# Patient Record
Sex: FEMALE | Race: WHITE | NOT HISPANIC OR LATINO | URBAN - METROPOLITAN AREA
[De-identification: names, ages, dates, MRNs, and addresses within clinical notes are randomized per-mention and may not be internally consistent; named-entity substitution may affect disease eponyms.]

---

## 2021-05-18 ENCOUNTER — INPATIENT (INPATIENT)
Facility: HOSPITAL | Age: 51
LOS: 2 days | Discharge: HOME | DRG: 603 | End: 2021-05-21
Attending: INTERNAL MEDICINE | Admitting: STUDENT IN AN ORGANIZED HEALTH CARE EDUCATION/TRAINING PROGRAM
Payer: COMMERCIAL

## 2021-05-18 VITALS
HEART RATE: 95 BPM | TEMPERATURE: 99 F | DIASTOLIC BLOOD PRESSURE: 87 MMHG | OXYGEN SATURATION: 98 % | RESPIRATION RATE: 20 BRPM | SYSTOLIC BLOOD PRESSURE: 155 MMHG | HEIGHT: 64 IN | WEIGHT: 134.92 LBS

## 2021-05-18 LAB
ALBUMIN SERPL ELPH-MCNC: 3.6 G/DL — SIGNIFICANT CHANGE UP (ref 3.3–5)
ALP SERPL-CCNC: 53 U/L — SIGNIFICANT CHANGE UP (ref 40–120)
ALT FLD-CCNC: 8 U/L — LOW (ref 12–78)
ANION GAP SERPL CALC-SCNC: 6 MMOL/L — SIGNIFICANT CHANGE UP (ref 5–17)
APTT BLD: 25.8 SEC — LOW (ref 27.5–35.5)
AST SERPL-CCNC: 8 U/L — LOW (ref 15–37)
BASOPHILS # BLD AUTO: 0.04 K/UL — SIGNIFICANT CHANGE UP (ref 0–0.2)
BASOPHILS NFR BLD AUTO: 0.4 % — SIGNIFICANT CHANGE UP (ref 0–2)
BILIRUB SERPL-MCNC: 0.3 MG/DL — SIGNIFICANT CHANGE UP (ref 0.2–1.2)
BUN SERPL-MCNC: 11 MG/DL — SIGNIFICANT CHANGE UP (ref 7–23)
CALCIUM SERPL-MCNC: 8.8 MG/DL — SIGNIFICANT CHANGE UP (ref 8.5–10.1)
CHLORIDE SERPL-SCNC: 110 MMOL/L — HIGH (ref 96–108)
CO2 SERPL-SCNC: 25 MMOL/L — SIGNIFICANT CHANGE UP (ref 22–31)
CREAT SERPL-MCNC: 0.71 MG/DL — SIGNIFICANT CHANGE UP (ref 0.5–1.3)
EOSINOPHIL # BLD AUTO: 0.07 K/UL — SIGNIFICANT CHANGE UP (ref 0–0.5)
EOSINOPHIL NFR BLD AUTO: 0.8 % — SIGNIFICANT CHANGE UP (ref 0–6)
GLUCOSE SERPL-MCNC: 96 MG/DL — SIGNIFICANT CHANGE UP (ref 70–99)
HCG SERPL-ACNC: <1 MIU/ML — SIGNIFICANT CHANGE UP
HCT VFR BLD CALC: 39.1 % — SIGNIFICANT CHANGE UP (ref 34.5–45)
HGB BLD-MCNC: 13 G/DL — SIGNIFICANT CHANGE UP (ref 11.5–15.5)
IMM GRANULOCYTES NFR BLD AUTO: 0.3 % — SIGNIFICANT CHANGE UP (ref 0–1.5)
INR BLD: 0.97 RATIO — SIGNIFICANT CHANGE UP (ref 0.88–1.16)
LACTATE SERPL-SCNC: 1.1 MMOL/L — SIGNIFICANT CHANGE UP (ref 0.7–2)
LYMPHOCYTES # BLD AUTO: 1.86 K/UL — SIGNIFICANT CHANGE UP (ref 1–3.3)
LYMPHOCYTES # BLD AUTO: 20.7 % — SIGNIFICANT CHANGE UP (ref 13–44)
MCHC RBC-ENTMCNC: 29.2 PG — SIGNIFICANT CHANGE UP (ref 27–34)
MCHC RBC-ENTMCNC: 33.2 GM/DL — SIGNIFICANT CHANGE UP (ref 32–36)
MCV RBC AUTO: 87.9 FL — SIGNIFICANT CHANGE UP (ref 80–100)
MONOCYTES # BLD AUTO: 0.77 K/UL — SIGNIFICANT CHANGE UP (ref 0–0.9)
MONOCYTES NFR BLD AUTO: 8.6 % — SIGNIFICANT CHANGE UP (ref 2–14)
NEUTROPHILS # BLD AUTO: 6.21 K/UL — SIGNIFICANT CHANGE UP (ref 1.8–7.4)
NEUTROPHILS NFR BLD AUTO: 69.2 % — SIGNIFICANT CHANGE UP (ref 43–77)
NRBC # BLD: 0 /100 WBCS — SIGNIFICANT CHANGE UP (ref 0–0)
PLATELET # BLD AUTO: 254 K/UL — SIGNIFICANT CHANGE UP (ref 150–400)
POTASSIUM SERPL-MCNC: 3.8 MMOL/L — SIGNIFICANT CHANGE UP (ref 3.5–5.3)
POTASSIUM SERPL-SCNC: 3.8 MMOL/L — SIGNIFICANT CHANGE UP (ref 3.5–5.3)
PROT SERPL-MCNC: 7.4 G/DL — SIGNIFICANT CHANGE UP (ref 6–8.3)
PROTHROM AB SERPL-ACNC: 11.4 SEC — SIGNIFICANT CHANGE UP (ref 10.6–13.6)
RBC # BLD: 4.45 M/UL — SIGNIFICANT CHANGE UP (ref 3.8–5.2)
RBC # FLD: 12.7 % — SIGNIFICANT CHANGE UP (ref 10.3–14.5)
SODIUM SERPL-SCNC: 141 MMOL/L — SIGNIFICANT CHANGE UP (ref 135–145)
WBC # BLD: 8.98 K/UL — SIGNIFICANT CHANGE UP (ref 3.8–10.5)
WBC # FLD AUTO: 8.98 K/UL — SIGNIFICANT CHANGE UP (ref 3.8–10.5)

## 2021-05-18 PROCEDURE — 73130 X-RAY EXAM OF HAND: CPT | Mod: 26,RT

## 2021-05-18 PROCEDURE — 99285 EMERGENCY DEPT VISIT HI MDM: CPT

## 2021-05-18 PROCEDURE — 93010 ELECTROCARDIOGRAM REPORT: CPT

## 2021-05-18 RX ORDER — AMPICILLIN SODIUM AND SULBACTAM SODIUM 250; 125 MG/ML; MG/ML
3 INJECTION, POWDER, FOR SUSPENSION INTRAMUSCULAR; INTRAVENOUS ONCE
Refills: 0 | Status: COMPLETED | OUTPATIENT
Start: 2021-05-18 | End: 2021-05-18

## 2021-05-18 RX ORDER — SODIUM CHLORIDE 9 MG/ML
1000 INJECTION INTRAMUSCULAR; INTRAVENOUS; SUBCUTANEOUS ONCE
Refills: 0 | Status: COMPLETED | OUTPATIENT
Start: 2021-05-18 | End: 2021-05-18

## 2021-05-18 RX ADMIN — AMPICILLIN SODIUM AND SULBACTAM SODIUM 3 GRAM(S): 250; 125 INJECTION, POWDER, FOR SUSPENSION INTRAMUSCULAR; INTRAVENOUS at 23:31

## 2021-05-18 RX ADMIN — AMPICILLIN SODIUM AND SULBACTAM SODIUM 200 GRAM(S): 250; 125 INJECTION, POWDER, FOR SUSPENSION INTRAMUSCULAR; INTRAVENOUS at 22:00

## 2021-05-18 RX ADMIN — SODIUM CHLORIDE 1000 MILLILITER(S): 9 INJECTION INTRAMUSCULAR; INTRAVENOUS; SUBCUTANEOUS at 23:31

## 2021-05-18 RX ADMIN — SODIUM CHLORIDE 1000 MILLILITER(S): 9 INJECTION INTRAMUSCULAR; INTRAVENOUS; SUBCUTANEOUS at 22:00

## 2021-05-18 NOTE — ED PROVIDER NOTE - OBJECTIVE STATEMENT
51 yo F p/w was bit by a known cat in R hand yesterday. Outdoor cat can be monitored for behavioral changes. Pt was seen at urgent care after and started on augmentin. pt with inc redness and swelling, now spreading proximally. No fever/chills. no numb/ting/focal weak. no abd pain. no n/v/d. no neck /back pain. no agg/allev factors. no other inj or co.

## 2021-05-18 NOTE — ED ADULT NURSE REASSESSMENT NOTE - NS ED NURSE REASSESS COMMENT FT1
Patient walked to the bathroom informed of the plan of care with understanding waiting for MD to talk to her.

## 2021-05-18 NOTE — ED PROVIDER NOTE - PROGRESS NOTE DETAILS
attempted to call Dr. ROBERT Lester multiple times, no answer, left VM, Dr. Myers made aware and will now admit

## 2021-05-18 NOTE — ED ADULT NURSE NOTE - OBJECTIVE STATEMENT
Patient presents to ED with complaint of cat bite last night to her right hand noted with swelling and redness was seen at urgent care and was prescribed augmentin which she started already was seen and evaluated by MD Avalos with orders made and carried out blood sent to lab due medications given as per order.

## 2021-05-18 NOTE — ED PROVIDER NOTE - PHYSICAL EXAMINATION
R hand: pos bite to R 2nd MCP with dorcal erythema / waemth with spread to dorsum of hand, streaking to mid forearm. FROM all joints without pain. nl dist str/sens equal bl, 2+ pulses. nl cap refill.

## 2021-05-18 NOTE — ED ADULT TRIAGE NOTE - CHIEF COMPLAINT QUOTE
Pt bit by stray cat yesterday to Rt hand, was seen in urgent care given tetanus shot and Augmentin, today swelling is worse

## 2021-05-19 DIAGNOSIS — Z98.891 HISTORY OF UTERINE SCAR FROM PREVIOUS SURGERY: Chronic | ICD-10-CM
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
## 2021-05-19 LAB
ALBUMIN SERPL ELPH-MCNC: 3.2 G/DL — LOW (ref 3.3–5)
ALP SERPL-CCNC: 44 U/L — SIGNIFICANT CHANGE UP (ref 40–120)
ALT FLD-CCNC: 9 U/L — LOW (ref 12–78)
ANION GAP SERPL CALC-SCNC: 9 MMOL/L — SIGNIFICANT CHANGE UP (ref 5–17)
AST SERPL-CCNC: 8 U/L — LOW (ref 15–37)
BASOPHILS # BLD AUTO: 0.04 K/UL — SIGNIFICANT CHANGE UP (ref 0–0.2)
BASOPHILS NFR BLD AUTO: 0.5 % — SIGNIFICANT CHANGE UP (ref 0–2)
BILIRUB SERPL-MCNC: 0.5 MG/DL — SIGNIFICANT CHANGE UP (ref 0.2–1.2)
BUN SERPL-MCNC: 7 MG/DL — SIGNIFICANT CHANGE UP (ref 7–23)
CALCIUM SERPL-MCNC: 8.2 MG/DL — LOW (ref 8.5–10.1)
CHLORIDE SERPL-SCNC: 110 MMOL/L — HIGH (ref 96–108)
CO2 SERPL-SCNC: 23 MMOL/L — SIGNIFICANT CHANGE UP (ref 22–31)
CREAT SERPL-MCNC: 0.63 MG/DL — SIGNIFICANT CHANGE UP (ref 0.5–1.3)
EOSINOPHIL # BLD AUTO: 0.07 K/UL — SIGNIFICANT CHANGE UP (ref 0–0.5)
EOSINOPHIL NFR BLD AUTO: 0.9 % — SIGNIFICANT CHANGE UP (ref 0–6)
GLUCOSE SERPL-MCNC: 84 MG/DL — SIGNIFICANT CHANGE UP (ref 70–99)
HCT VFR BLD CALC: 36.1 % — SIGNIFICANT CHANGE UP (ref 34.5–45)
HGB BLD-MCNC: 12.2 G/DL — SIGNIFICANT CHANGE UP (ref 11.5–15.5)
IMM GRANULOCYTES NFR BLD AUTO: 0.3 % — SIGNIFICANT CHANGE UP (ref 0–1.5)
LYMPHOCYTES # BLD AUTO: 1.75 K/UL — SIGNIFICANT CHANGE UP (ref 1–3.3)
LYMPHOCYTES # BLD AUTO: 23.2 % — SIGNIFICANT CHANGE UP (ref 13–44)
MCHC RBC-ENTMCNC: 29.8 PG — SIGNIFICANT CHANGE UP (ref 27–34)
MCHC RBC-ENTMCNC: 33.8 GM/DL — SIGNIFICANT CHANGE UP (ref 32–36)
MCV RBC AUTO: 88.3 FL — SIGNIFICANT CHANGE UP (ref 80–100)
MONOCYTES # BLD AUTO: 0.6 K/UL — SIGNIFICANT CHANGE UP (ref 0–0.9)
MONOCYTES NFR BLD AUTO: 8 % — SIGNIFICANT CHANGE UP (ref 2–14)
NEUTROPHILS # BLD AUTO: 5.05 K/UL — SIGNIFICANT CHANGE UP (ref 1.8–7.4)
NEUTROPHILS NFR BLD AUTO: 67.1 % — SIGNIFICANT CHANGE UP (ref 43–77)
NRBC # BLD: 0 /100 WBCS — SIGNIFICANT CHANGE UP (ref 0–0)
PLATELET # BLD AUTO: 228 K/UL — SIGNIFICANT CHANGE UP (ref 150–400)
POTASSIUM SERPL-MCNC: 3.8 MMOL/L — SIGNIFICANT CHANGE UP (ref 3.5–5.3)
POTASSIUM SERPL-SCNC: 3.8 MMOL/L — SIGNIFICANT CHANGE UP (ref 3.5–5.3)
PROT SERPL-MCNC: 7 G/DL — SIGNIFICANT CHANGE UP (ref 6–8.3)
RBC # BLD: 4.09 M/UL — SIGNIFICANT CHANGE UP (ref 3.8–5.2)
RBC # FLD: 12.8 % — SIGNIFICANT CHANGE UP (ref 10.3–14.5)
SARS-COV-2 RNA SPEC QL NAA+PROBE: SIGNIFICANT CHANGE UP
SODIUM SERPL-SCNC: 142 MMOL/L — SIGNIFICANT CHANGE UP (ref 135–145)
WBC # BLD: 7.53 K/UL — SIGNIFICANT CHANGE UP (ref 3.8–10.5)
WBC # FLD AUTO: 7.53 K/UL — SIGNIFICANT CHANGE UP (ref 3.8–10.5)

## 2021-05-19 PROCEDURE — 99222 1ST HOSP IP/OBS MODERATE 55: CPT | Mod: GC

## 2021-05-19 RX ORDER — IBUPROFEN 200 MG
600 TABLET ORAL EVERY 6 HOURS
Refills: 0 | Status: DISCONTINUED | OUTPATIENT
Start: 2021-05-19 | End: 2021-05-21

## 2021-05-19 RX ORDER — NORETHINDRONE AND ETHINYL ESTRADIOL 0.4-0.035
1 KIT ORAL
Qty: 0 | Refills: 0 | DISCHARGE

## 2021-05-19 RX ORDER — AMPICILLIN SODIUM AND SULBACTAM SODIUM 250; 125 MG/ML; MG/ML
3 INJECTION, POWDER, FOR SUSPENSION INTRAMUSCULAR; INTRAVENOUS EVERY 6 HOURS
Refills: 0 | Status: COMPLETED | OUTPATIENT
Start: 2021-05-19 | End: 2021-05-20

## 2021-05-19 RX ORDER — ACETAMINOPHEN 500 MG
650 TABLET ORAL EVERY 4 HOURS
Refills: 0 | Status: DISCONTINUED | OUTPATIENT
Start: 2021-05-19 | End: 2021-05-21

## 2021-05-19 RX ORDER — KETOROLAC TROMETHAMINE 30 MG/ML
15 SYRINGE (ML) INJECTION EVERY 6 HOURS
Refills: 0 | Status: DISCONTINUED | OUTPATIENT
Start: 2021-05-19 | End: 2021-05-21

## 2021-05-19 RX ORDER — ENOXAPARIN SODIUM 100 MG/ML
40 INJECTION SUBCUTANEOUS DAILY
Refills: 0 | Status: DISCONTINUED | OUTPATIENT
Start: 2021-05-19 | End: 2021-05-21

## 2021-05-19 RX ORDER — SACCHAROMYCES BOULARDII 250 MG
250 POWDER IN PACKET (EA) ORAL
Refills: 0 | Status: DISCONTINUED | OUTPATIENT
Start: 2021-05-19 | End: 2021-05-21

## 2021-05-19 RX ADMIN — Medication 250 MILLIGRAM(S): at 06:47

## 2021-05-19 RX ADMIN — AMPICILLIN SODIUM AND SULBACTAM SODIUM 200 GRAM(S): 250; 125 INJECTION, POWDER, FOR SUSPENSION INTRAMUSCULAR; INTRAVENOUS at 06:47

## 2021-05-19 RX ADMIN — AMPICILLIN SODIUM AND SULBACTAM SODIUM 200 GRAM(S): 250; 125 INJECTION, POWDER, FOR SUSPENSION INTRAMUSCULAR; INTRAVENOUS at 17:59

## 2021-05-19 RX ADMIN — Medication 250 MILLIGRAM(S): at 18:00

## 2021-05-19 RX ADMIN — AMPICILLIN SODIUM AND SULBACTAM SODIUM 200 GRAM(S): 250; 125 INJECTION, POWDER, FOR SUSPENSION INTRAMUSCULAR; INTRAVENOUS at 11:12

## 2021-05-19 RX ADMIN — ENOXAPARIN SODIUM 40 MILLIGRAM(S): 100 INJECTION SUBCUTANEOUS at 11:12

## 2021-05-19 NOTE — CONSULT NOTE ADULT - SUBJECTIVE AND OBJECTIVE BOX
CARDIOLOGY CONSULT NOTE    Patient is a 50y Female with a known history of :  MVP    HPI:  51 yo f PMH mitral valve prolapse presents s/p cat bite. 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 Per patient the area "turned blue" soon after. Patient went to Urgent Care where she received a tetanus vaccine and was prescribed Augmentin 875mg BID. Patient took her evening and morning doses. On day of admission patient noted increased swelling and progression of erythema to the dorsal hand and distal dorsal forearm on the radial aspect. Notes pain 7/10 at rest, 9/10 w/ motion. She is able to flex and extend her index finger, flexion limited by pain.     In the ED CBC, CMP, coags, hcg, lactate, COVID PCR were performed. Labs grossly wnl. Pt was given 1L NS. Unasyn 3g x 1. X ray of hand was performed.   EKG NSR, Qtc 450 (19 May 2021 01:34)      REVIEW OF SYSTEMS:    CONSTITUTIONAL: No fever, weight loss, or fatigue  EYES: No eye pain, visual disturbances, or discharge  ENMT:  No difficulty hearing, tinnitus, vertigo; No sinus or throat pain  NECK: No pain or stiffness  BREASTS: No pain, masses, or nipple discharge  RESPIRATORY: No cough, wheezing, chills or hemoptysis; No shortness of breath  CARDIOVASCULAR: No chest pain, palpitations, dizziness, or leg swelling  GASTROINTESTINAL: No abdominal or epigastric pain. No nausea, vomiting, or hematemesis; No diarrhea or constipation. No melena or hematochezia.  GENITOURINARY: No dysuria, frequency, hematuria, or incontinence  NEUROLOGICAL: No headaches, memory loss, loss of strength, numbness, or tremors  SKIN: No itching, burning, rashes, or lesions   LYMPH NODES: No enlarged glands  ENDOCRINE: No heat or cold intolerance; No hair loss  MUSCULOSKELETAL: No joint pain or swelling; No muscle, back, or extremity pain  PSYCHIATRIC: No depression, anxiety, mood swings, or difficulty sleeping  HEME/LYMPH: No easy bruising, or bleeding gums  ALLERGY AND IMMUNOLOGIC: No hives or eczema    MEDICATIONS  (STANDING):  ampicillin/sulbactam  IVPB 3 Gram(s) IV Intermittent every 6 hours  enoxaparin Injectable 40 milliGRAM(s) SubCutaneous daily  saccharomyces boulardii 250 milliGRAM(s) Oral two times a day  Tri-legest fe 1 Tablet(s) 1 Tablet(s) Oral at bedtime    MEDICATIONS  (PRN):  acetaminophen   Tablet .. 650 milliGRAM(s) Oral every 4 hours PRN Mild Pain (1 - 3)  ibuprofen  Tablet. 600 milliGRAM(s) Oral every 6 hours PRN Moderate Pain (4 - 6)  ketorolac   Injectable 15 milliGRAM(s) IV Push every 6 hours PRN Severe Pain (7 - 10)      ALLERGIES: No Known Allergies      FAMILY HISTORY:  FH: HTN (hypertension) (Father, Sibling)    FH: hyperlipidemia (Father, Sibling)    FH: diabetes mellitus (Father, Sibling, Mother)        PHYSICAL EXAMINATION:  -----------------------------  T(C): 36.6 (05-19-21 @ 05:23), Max: 37.7 (05-18-21 @ 22:41)  HR: 80 (05-19-21 @ 05:23) (80 - 95)  BP: 107/66 (05-19-21 @ 05:23) (107/66 - 155/87)  RR: 16 (05-19-21 @ 05:23) (16 - 20)  SpO2: 98% (05-19-21 @ 05:23) (98% - 100%)  Wt(kg): --    Height (cm): 162.6 (05-18 @ 20:45)  Weight (kg): 65.5 (05-19 @ 02:36)  BMI (kg/m2): 24.8 (05-19 @ 02:36)  BSA (m2): 1.7 (05-19 @ 02:36)    Constitutional: well developed, normal appearance, well groomed, well nourished, no deformities and no acute distress.   Eyes: the conjunctiva exhibited no abnormalities and the eyelids demonstrated no xanthelasmas.   HEENT: normal oral mucosa, no oral pallor and no oral cyanosis.   Neck: normal jugular venous A waves present, normal jugular venous V waves present and no jugular venous miguel A waves.   Pulmonary: no respiratory distress, normal respiratory rhythm and effort, no accessory muscle use and lungs were clear to auscultation bilaterally.   Cardiovascular: heart rate and rhythm were normal, normal S1 and S2 and no murmur, gallop, rub, heave or thrill are present.   Abdomen: soft, non-tender, no hepato-splenomegaly and no abdominal mass palpated.   Musculoskeletal: the gait could not be assessed..   Extremities: no clubbing of the fingernails, no localized cyanosis, no petechial hemorrhages and no ischemic changes.   Skin: normal skin color and pigmentation, no rash, no venous stasis, no skin lesions, no skin ulcer and no xanthoma was observed.   Psychiatric: oriented to person, place, and time, the affect was normal, the mood was normal and not feeling anxious.     LABS:   --------  05-18    141  |  110<H>  |  11  ----------------------------<  96  3.8   |  25  |  0.71    Ca    8.8      18 May 2021 22:06    TPro  7.4  /  Alb  3.6  /  TBili  0.3  /  DBili  x   /  AST  8<L>  /  ALT  8<L>  /  AlkPhos  53  05-18                         13.0   8.98  )-----------( 254      ( 18 May 2021 22:06 )             39.1     PT/INR - ( 18 May 2021 22:06 )   PT: 11.4 sec;   INR: 0.97 ratio         PTT - ( 18 May 2021 22:06 )  PTT:25.8 sec            RADIOLOGY:  -----------------        ECG:  CARDIOLOGY CONSULT NOTE    Patient is a 50y Female with a known history of :  MVP    HPI:  51 yo f PMH mitral valve prolapse presents s/p cat bite. 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 Per patient the area "turned blue" soon after. Patient went to Urgent Care where she received a tetanus vaccine and was prescribed Augmentin 875mg BID. Patient took her evening and morning doses. On day of admission patient noted increased swelling and progression of erythema to the dorsal hand and distal dorsal forearm on the radial aspect. Notes pain 7/10 at rest, 9/10 w/ motion. She is able to flex and extend her index finger, flexion limited by pain.     In the ED CBC, CMP, coags, hcg, lactate, COVID PCR were performed. Labs grossly wnl. Pt was given 1L NS. Unasyn 3g x 1. X ray of hand was performed.   EKG NSR, Qtc 450 (19 May 2021 01:34)      REVIEW OF SYSTEMS:    CONSTITUTIONAL: No fever, weight loss, or fatigue  ENMT:  No difficulty hearing, tinnitus, vertigo; No sinus or throat pain  NECK: No pain or stiffness  RESPIRATORY: No cough, wheezing, chills or hemoptysis; No shortness of breath  CARDIOVASCULAR: No chest pain, palpitations, dizziness, or leg swelling  GASTROINTESTINAL: No abdominal or epigastric pain. No nausea, vomiting, or hematemesis; No melena or hematochezia.  NEUROLOGICAL: No headaches, memory loss, loss of strength, numbness, or tremors  LYMPH NODES: No enlarged glands  MUSCULOSKELETAL: No joint pain or swelling; No muscle, back, or extremity pain  PSYCHIATRIC: No depression, anxiety, mood swings, or difficulty sleeping  HEME/LYMPH: No easy bruising, or bleeding gums  ALLERGY AND IMMUNOLOGIC: No hives or eczema    MEDICATIONS  (STANDING):  ampicillin/sulbactam  IVPB 3 Gram(s) IV Intermittent every 6 hours  enoxaparin Injectable 40 milliGRAM(s) SubCutaneous daily  saccharomyces boulardii 250 milliGRAM(s) Oral two times a day  Tri-legest fe 1 Tablet(s) 1 Tablet(s) Oral at bedtime    MEDICATIONS  (PRN):  acetaminophen   Tablet .. 650 milliGRAM(s) Oral every 4 hours PRN Mild Pain (1 - 3)  ibuprofen  Tablet. 600 milliGRAM(s) Oral every 6 hours PRN Moderate Pain (4 - 6)  ketorolac   Injectable 15 milliGRAM(s) IV Push every 6 hours PRN Severe Pain (7 - 10)      ALLERGIES: No Known Allergies      FAMILY HISTORY:  FH: HTN (hypertension) (Father, Sibling)    FH: hyperlipidemia (Father, Sibling)    FH: diabetes mellitus (Father, Sibling, Mother)        PHYSICAL EXAMINATION:  -----------------------------  T(C): 36.6 (05-19-21 @ 05:23), Max: 37.7 (05-18-21 @ 22:41)  HR: 80 (05-19-21 @ 05:23) (80 - 95)  BP: 107/66 (05-19-21 @ 05:23) (107/66 - 155/87)  RR: 16 (05-19-21 @ 05:23) (16 - 20)  SpO2: 98% (05-19-21 @ 05:23) (98% - 100%)  Wt(kg): --    Height (cm): 162.6 (05-18 @ 20:45)  Weight (kg): 65.5 (05-19 @ 02:36)  BMI (kg/m2): 24.8 (05-19 @ 02:36)  BSA (m2): 1.7 (05-19 @ 02:36)    Constitutional: well developed, normal appearance, well groomed, well nourished, no deformities and no acute distress.   Eyes: the conjunctiva exhibited no abnormalities and the eyelids demonstrated no xanthelasmas.   HEENT: normal oral mucosa, no oral pallor and no oral cyanosis.   Neck: normal jugular venous, no bruit    Pulmonary: no respiratory distress, normal respiratory rhythm and effort, no accessory muscle use and lungs were clear to auscultation bilaterally.   Cardiovascular: heart rate and rhythm were normal, normal S1 and S2 and no murmur, gallop, rub, heave or thrill are present.   Abdomen: soft, non-tender, no hepato-splenomegaly and no abdominal mass palpated.   Musculoskeletal: the gait could not be assessed..   Extremities: no clubbing of the fingernails, no localized cyanosis, no petechial hemorrhages and no ischemic changes.   Skin: left hand with focal redness and swelling with prior demarcation.   Psychiatric: oriented to person, place, and time, the affect was normal, the mood was normal and not feeling anxious.     LABS:   --------  05-18    141  |  110<H>  |  11  ----------------------------<  96  3.8   |  25  |  0.71    Ca    8.8      18 May 2021 22:06    TPro  7.4  /  Alb  3.6  /  TBili  0.3  /  DBili  x   /  AST  8<L>  /  ALT  8<L>  /  AlkPhos  53  05-18                         13.0   8.98  )-----------( 254      ( 18 May 2021 22:06 )             39.1     PT/INR - ( 18 May 2021 22:06 )   PT: 11.4 sec;   INR: 0.97 ratio         PTT - ( 18 May 2021 22:06 )  PTT:25.8 sec            RADIOLOGY:  -----------------        ECG:   NSR, Q, psuedo Q V1-2

## 2021-05-19 NOTE — H&P ADULT - NSHPPHYSICALEXAM_GEN_ALL_CORE
Vital Signs Last 24 Hrs  T(C): 37.2 (19 May 2021 01:22), Max: 37.7 (18 May 2021 22:41)  T(F): 98.9 (19 May 2021 01:22), Max: 99.8 (18 May 2021 22:41)  HR: 92 (19 May 2021 01:22) (88 - 95)  BP: 129/82 (19 May 2021 01:22) (129/82 - 155/87)  BP(mean): --  RR: 16 (19 May 2021 01:22) (16 - 20)  SpO2: 99% (19 May 2021 01:22) (98% - 99%) Vital Signs Last 24 Hrs  T(C): 37.2 (19 May 2021 01:22), Max: 37.7 (18 May 2021 22:41)  T(F): 98.9 (19 May 2021 01:22), Max: 99.8 (18 May 2021 22:41)  HR: 92 (19 May 2021 01:22) (88 - 95)  BP: 129/82 (19 May 2021 01:22) (129/82 - 155/87)  BP(mean): --  RR: 16 (19 May 2021 01:22) (16 - 20)  SpO2: 99% (19 May 2021 01:22) (98% - 99%)    GENERAL: patient appears well, no acute distress, conversant  EYES: anicteric sclerae, no exudates  ENMT: oropharynx clear without erythema, no exudates, moist mucous membranes  NECK: supple, soft, no thyromegaly noted  LUNGS: clear to auscultation, no intercostal retractions  HEART: S1/S2, regular rate and rhythm, no murmurs noted, no lower extremity edema  GASTROINTESTINAL: abdomen is soft, nontender, nondistended, normoactive bowel sounds  INTEGUMENT: good skin turgor, warm skin, appears well perfused  MUSCULOSKELETAL: R 2nd phalynx in the dorsal proximal interphalangeal space puncture wound, no active bleeding, surrounding warmth, edema extending to the dorsal hand overlying the 1st MCP,  erythema surrounding the wound and extending along the 1st MCP to the distal third of the dorsal radial aspect of the forearm, PROM intact on flexion and extension, AROM limited by pain on flexion, intact on extension; neurovascularly intact 2nd digit. no clubbing or cyanosis, no obvious deformity  NEUROLOGIC: awake, alert, oriented x3, good muscle tone in 4 extremities, no obvious sensory deficits  PSYCHIATRIC: mood is good, affect is congruent, linear and logical thought process  HEME/LYMPH: no palpable supraclavicular nodules, no obvious ecchymosis or petechiae

## 2021-05-19 NOTE — ED ADULT NURSE REASSESSMENT NOTE - NS ED NURSE REASSESS COMMENT FT1
MD Koenig spoke with patient and advised of the plan to stay in the hospital was trying to get in touch with admitting MD Lester.

## 2021-05-19 NOTE — CONSULT NOTE ADULT - SUBJECTIVE AND OBJECTIVE BOX
HPI:  51YO F PMH mitral valve prolapse presented to the hospital with CC of right hand swelling redness and pain s/p cat bite. 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 Was seen at  Urgent Care where she received a tetanus vaccine and was prescribed Augmentin 875mg BID., she took 2 doses but noted to have worsenning erythema and and swelling. Afebrile. WBC wnl.    Infectious Disease consult was called to evaluate pt and for antibiotic management.    Past Medical & Surgical Hx:  PAST MEDICAL & SURGICAL HISTORY:  Mitral valve prolapse  S/P  section    Social History--  EtOH: denies   Tobacco: denies   Drug Use: denies       FAMILY HISTORY:  FH: HTN (hypertension) (Father, Sibling)  FH: hyperlipidemia (Father, Sibling)  FH: diabetes mellitus (Father, Sibling, Mother)      Allergies  No Known Allergies    Intolerances  NONE    Home Medications:  Tri-Legest Fe oral tablet: 1 tab(s) orally once a day (19 May 2021 02:29)      Current Inpatient Medications :    ANTIBIOTICS:   ampicillin/sulbactam  IVPB 3 Gram(s) IV Intermittent every 6 hours      OTHER RELEVANT MEDICATIONS :  acetaminophen   Tablet .. 650 milliGRAM(s) Oral every 4 hours PRN  enoxaparin Injectable 40 milliGRAM(s) SubCutaneous daily  ibuprofen  Tablet. 600 milliGRAM(s) Oral every 6 hours PRN  ketorolac   Injectable 15 milliGRAM(s) IV Push every 6 hours PRN      ROS:  CONSTITUTIONAL:  Negative fever or chills  EYES:  Negative  blurry vision or double vision  CARDIOVASCULAR:  Negative for chest pain or palpitations  RESPIRATORY:  Negative for cough, wheezing, or SOB   GASTROINTESTINAL:  Negative for nausea, vomiting, diarrhea, constipation, or abdominal pain  GENITOURINARY:  Negative frequency, urgency , dysuria or hematuria   NEUROLOGIC:  No headache, confusion, dizziness, lightheadedness  All other systems were reviewed and are negative    Physical Exam:  Vital Signs Last 24 Hrs  T(C): 36.7 (19 May 2021 13:01), Max: 37.7 (18 May 2021 22:41)  T(F): 98 (19 May 2021 13:01), Max: 99.8 (18 May 2021 22:41)  HR: 90 (19 May 2021 13:01) (80 - 95)  BP: 120/79 (19 May 2021 13:01) (107/66 - 155/87)  RR: 17 (19 May 2021 13:01) (16 - 20)  SpO2: 99% (19 May 2021 13:01) (98% - 100%)  Height (cm): 162.6 ( @ 20:45)  Weight (kg): 65.5 ( @ 02:36)  BMI (kg/m2): 24.8 ( @ 02:36)  BSA (m2): 1.7 ( @ 02:36)    General: no acute distress  Neck: supple, trachea midline  Lungs: clear, no wheeze/rhonchi  Cardiovascular: regular rate and rhythm, S1 S2  Abdomen: soft, nontender, ND, bowel sounds normal  Neurological:  alert and oriented x3  Skin: no rash  Extremities: right 2nd digit swelling erythema with hand cellulitis      Labs:                         12.2   7.53  )-----------( 228      ( 19 May 2021 08:19 )             36.1       142  |  110<H>  |  7   ----------------------------<  84  3.8   |  23  |  0.63    Ca    8.2<L>      19 May 2021 08:19    TPro  7.0  /  Alb  3.2<L>  /  TBili  0.5  /  DBili  x   /  AST  8<L>  /  ALT  9<L>  /  AlkPhos  44        RECENT CULTURES:        RADIOLOGY & ADDITIONAL STUDIES:    EXAM:  XR HAND MIN 3 VIEWS RT                            PROCEDURE DATE:  2021          INTERPRETATION:  Right hand. Patient has an infected cat bite. 3 views.    There is slight carpal degeneration.    No bone destruction or fracture. No radiopaque foreign body or gas in soft tissues evident.    IMPRESSION: No acute finding.    Assessment :   51YO F PMH mitral valve prolapse admitted with right hand and 2nd digit cellulitis possible tenosynovitis sec cat bite.   Pt unclear of cat's vaccination status  Received a tetanus vaccine  High risk for OM     Plan :   Cont Unasyn  Fu cultures  Trend temps and cbc  Will follow      Continue with present regiment .  Appropriate use of antibiotics and adverse effects reviewed.      I have discussed the above plan of care with patient in detail. She expressed understanding of the treatment plan . Risks, benefits and alternatives discussed in detail. I have asked if she has any questions or concerns and appropriately addressed them to the best of my ability .      > 45 minutes spent in direct patient care reviewing  the notes, lab data/ imaging , discussion with multidisciplinary team. All questions were addressed and answered to the best of my capacity .    Thank you for allowing me to participate in the care of your patient .      Samson Gee MD  Infectious Disease  [phone]

## 2021-05-19 NOTE — H&P ADULT - HISTORY OF PRESENT ILLNESS
49 yo f no significant pmh presents s/p cat bite.       In the ED CBC, CMP, coags, hcg, lactate, COVID PCR were performed. Labs grossly wnl. Pt was given 1L NS. Unasyn 3g x 1. X ray of hand was performed.  49 yo f PMH mitral valve prolapse presents s/p cat bite. 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 Per patient the area "turned blue" soon after. Patient went to Urgent Care where she received a tetanus vaccine and was prescribed Augmentin 875mg BID. Patient took her evening and morning doses. On day of admission patient noted increased swelling and progression of erythema to the dorsal hand and distal dorsal forearm on the radial aspect. Notes pain 7/10 at rest, 9/10 w/ motion. She is able to flex and extend her index finger, flexion limited by pain.     In the ED CBC, CMP, coags, hcg, lactate, COVID PCR were performed. Labs grossly wnl. Pt was given 1L NS. Unasyn 3g x 1. X ray of hand was performed.  49 yo f PMH mitral valve prolapse presents s/p cat bite. 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 Per patient the area "turned blue" soon after. Patient went to Urgent Care where she received a tetanus vaccine and was prescribed Augmentin 875mg BID. Patient took her evening and morning doses. On day of admission patient noted increased swelling and progression of erythema to the dorsal hand and distal dorsal forearm on the radial aspect. Notes pain 7/10 at rest, 9/10 w/ motion. She is able to flex and extend her index finger, flexion limited by pain.     In the ED CBC, CMP, coags, hcg, lactate, COVID PCR were performed. Labs grossly wnl. Pt was given 1L NS. Unasyn 3g x 1. X ray of hand was performed.   EKG NSR, Qtc 450

## 2021-05-19 NOTE — H&P ADULT - NSICDXFAMILYHX_GEN_ALL_CORE_FT
FAMILY HISTORY:  Father  Still living? Unknown  FH: diabetes mellitus, Age at diagnosis: Age Unknown  FH: HTN (hypertension), Age at diagnosis: Age Unknown  FH: hyperlipidemia, Age at diagnosis: Age Unknown    Mother  Still living? Unknown  FH: diabetes mellitus, Age at diagnosis: Age Unknown    Sibling  Still living? Unknown  FH: diabetes mellitus, Age at diagnosis: Age Unknown  FH: HTN (hypertension), Age at diagnosis: Age Unknown  FH: hyperlipidemia, Age at diagnosis: Age Unknown

## 2021-05-19 NOTE — H&P ADULT - NSHPREVIEWOFSYSTEMS_GEN_ALL_CORE
Constitutional: denies fever, chills, general malaise  HEENT: denies dry mouth, sore throat, runny nose, visual changes  Respiratory: denies SOB, YANG, cough, sputum production, wheezing, hemoptysis  Cardiovascular: denies CP, palpitations, edema  Gastrointestinal: denies nausea, vomiting, diarrhea, constipation, abdominal pain, melena, hematochezia   Genitourinary: denies dysuria, frequency, urgency, incontinence, hematuria   Skin/Breast: admits cat bite to hand  Musculoskeletal: denies myalgias, arthritis, joint swelling, muscle weakness  Neurologic: denies syncope, LOC, headache, weakness, dizziness, paresthesias, numbness  Hematology/Oncology: denies bruising, tender or enlarged lymph nodes   ROS negative except as noted above Constitutional: denies fever, chills, general malaise  HEENT: denies dry mouth, sore throat, runny nose, visual changes  Respiratory: denies SOB, YANG, cough, sputum production, wheezing, hemoptysis  Cardiovascular: denies CP, palpitations, edema  Gastrointestinal: denies nausea, vomiting, diarrhea, constipation, abdominal pain, melena, hematochezia   Genitourinary: denies dysuria, frequency, urgency, incontinence, hematuria   Skin/Breast: admits cat bite to hand  Musculoskeletal: R index finger pain 7-9/10, swelling, progression of erythema to distal forearm, denies myalgias, arthritis, joint swelling, muscle weakness  Neurologic: denies syncope, LOC, headache, weakness, dizziness, paresthesias, numbness  Hematology/Oncology: denies bruising, tender or enlarged lymph nodes   ROS negative except as noted above

## 2021-05-19 NOTE — H&P ADULT - ASSESSMENT
51 yo f no significant pmh presents s/p cat bite. Admit for cellulitis     # Cellulits   51 yo f no significant pmh presents s/p cat bite. Admit for cellulitis     # Cellulits  -R 2nd digit w/ edema and erythema tracking despite x2 doses Augmentin outpatient  -s/p Tetanus booster in urgent care  -s/p Unasyn 3g in ED  -Cont Unasyn 3g q6  -Start Florastor  -f/u Xray in AM  -Margin of erythema marked by pen, if progresses will consult Ortho  -Monitor for neurovascular changes, and changes in AROM/PROM from initial exam    #OCPs  -Cont Tri-Legest OCP from home    #Prophylactic measures  -DVT ppx: Lovenox qd     51 yo f no significant pmh presents s/p cat bite. Admit for cellulitis     # Cellulits  -R 2nd digit w/ edema and erythema tracking despite x2 doses Augmentin outpatient  -s/p Tetanus booster in urgent care  -s/p Unasyn 3g in ED  -Cont Unasyn 3g q6  -Start Florastor  -Pain regimen: Tylenol for mild, Ibuprofen for moderate, Toradol for severe  -f/u Xray in AM  -Margin of erythema marked by pen, if progresses will consult Ortho  -Monitor for neurovascular changes, and changes in AROM/PROM from initial exam    #OCPs  -Cont Tri-Legest OCP from home    #Prophylactic measures  -DVT ppx: Lovenox qd     49 yo f no significant pmh presents s/p cat bite. Admit for cellulitis     # Cellulits  -R 2nd digit w/ edema and erythema tracking despite x2 doses Augmentin outpatient  -s/p Tetanus booster in urgent care  -s/p Unasyn 3g in ED  -Cont Unasyn 3g q6  -Start Florastor  -Pain regimen: Tylenol for mild, Ibuprofen for moderate, Toradol for severe  -f/u Xray in AM  -Margin of erythema marked by pen, if progresses will consult Plastic surgery  -Monitor for neurovascular changes, and changes in AROM/PROM from initial exam    #OCPs  -Cont Tri-Legest OCP from home    #Prophylactic measures  -DVT ppx: Lovenox qd

## 2021-05-19 NOTE — CONSULT NOTE ADULT - ASSESSMENT
49 yo f no significant pmh presents s/p cat bite. Admit for cellulitis     - Cellulits  Abx tx per primary team.    - MVP  Pt with h/o MVP last echo about 2 years ago with cardiologist at [facility].  Denies any cardiac sx with no chest discomfort, palpitation or shortness of breath.  Pt reports being very active with no sympotm.  Exam is unremarkable with no click or murmur appreciated.   No cardiac issue of concern at this time.    Will follow prn.

## 2021-05-19 NOTE — H&P ADULT - NSHPSOCIALHISTORY_GEN_ALL_CORE
Tobacco Use: no  Alcohol Use: social  Drug Use: no  Vaccinations: Flu +/ COVID (2/2) Pfizer   Diet: regular  CODE status: Full

## 2021-05-20 LAB
COVID-19 SPIKE DOMAIN AB INTERP: POSITIVE
COVID-19 SPIKE DOMAIN ANTIBODY RESULT: >250 U/ML — HIGH
SARS-COV-2 IGG+IGM SERPL QL IA: >250 U/ML — HIGH
SARS-COV-2 IGG+IGM SERPL QL IA: POSITIVE

## 2021-05-20 RX ORDER — CEFTRIAXONE 500 MG/1
2000 INJECTION, POWDER, FOR SOLUTION INTRAMUSCULAR; INTRAVENOUS EVERY 24 HOURS
Refills: 0 | Status: DISCONTINUED | OUTPATIENT
Start: 2021-05-21 | End: 2021-05-21

## 2021-05-20 RX ADMIN — AMPICILLIN SODIUM AND SULBACTAM SODIUM 200 GRAM(S): 250; 125 INJECTION, POWDER, FOR SUSPENSION INTRAMUSCULAR; INTRAVENOUS at 17:25

## 2021-05-20 RX ADMIN — Medication 250 MILLIGRAM(S): at 17:22

## 2021-05-20 RX ADMIN — Medication 250 MILLIGRAM(S): at 06:01

## 2021-05-20 RX ADMIN — AMPICILLIN SODIUM AND SULBACTAM SODIUM 200 GRAM(S): 250; 125 INJECTION, POWDER, FOR SUSPENSION INTRAMUSCULAR; INTRAVENOUS at 06:01

## 2021-05-20 RX ADMIN — AMPICILLIN SODIUM AND SULBACTAM SODIUM 200 GRAM(S): 250; 125 INJECTION, POWDER, FOR SUSPENSION INTRAMUSCULAR; INTRAVENOUS at 11:34

## 2021-05-20 RX ADMIN — ENOXAPARIN SODIUM 40 MILLIGRAM(S): 100 INJECTION SUBCUTANEOUS at 11:34

## 2021-05-20 RX ADMIN — AMPICILLIN SODIUM AND SULBACTAM SODIUM 200 GRAM(S): 250; 125 INJECTION, POWDER, FOR SUSPENSION INTRAMUSCULAR; INTRAVENOUS at 00:08

## 2021-05-20 NOTE — PROGRESS NOTE ADULT - ASSESSMENT
51 yo f no significant pmh presents s/p cat bite. Admit for cellulitis     # Cellulits  -R 2nd digit w/ edema and erythema tracking despite x2 doses Augmentin outpatient  -s/p Tetanus booster in urgent care  -s/p Unasyn 3g in ED  -Cont Unasyn 3g q6  -Start Florastor  -Pain regimen: Tylenol for mild, Ibuprofen for moderate, Toradol for severe  -f/u Xray in AM  -Margin of erythema marked by pen, if progresses will consult Plastic surgery  -Monitor for neurovascular changes, and changes in AROM/PROM from initial exam  #OCPs  -Cont Tri-Legest OCP from home    #Prophylactic measures  -DVT ppx: Lovenox qd

## 2021-05-20 NOTE — PROGRESS NOTE ADULT - SUBJECTIVE AND OBJECTIVE BOX
FER GUZMAN is a 50yFemale , patient examined and chart reviewed.    INTERVAL HPI/ OVERNIGHT EVENTS:   Afebrile.  No events.    PAST MEDICAL & SURGICAL HISTORY:  Mitral valve prolapse  S/P  section        For details regarding the patient's social history, family history, and other miscellaneous elements, please refer the initial infectious diseases consultation and/or the admitting history and physical examination for this admission.    ROS:  CONSTITUTIONAL:  Negative fever or chills  EYES:  Negative  blurry vision or double vision  CARDIOVASCULAR:  Negative for chest pain or palpitations  RESPIRATORY:  Negative for cough, wheezing, or SOB   GASTROINTESTINAL:  Negative for nausea, vomiting, diarrhea, constipation, or abdominal pain  GENITOURINARY:  Negative frequency, urgency or dysuria  NEUROLOGIC:  No headache, confusion, dizziness, lightheadedness  All other systems were reviewed and are negative       Current inpatient medications :    ANTIBIOTICS/RELEVANT:  ampicillin/sulbactam  IVPB 3 Gram(s) IV Intermittent every 6 hours  saccharomyces boulardii 250 milliGRAM(s) Oral two times a day  Tri-legest fe 1 Tablet(s) 1 Tablet(s) Oral at bedtime      acetaminophen   Tablet .. 650 milliGRAM(s) Oral every 4 hours PRN  enoxaparin Injectable 40 milliGRAM(s) SubCutaneous daily  ibuprofen  Tablet. 600 milliGRAM(s) Oral every 6 hours PRN  ketorolac   Injectable 15 milliGRAM(s) IV Push every 6 hours PRN      Objective:     @ 07:  -   @ 07:00  --------------------------------------------------------  IN: 200 mL / OUT: 0 mL / NET: 200 mL     @ 07:01  -   @ 16:11  --------------------------------------------------------  IN: 100 mL / OUT: 0 mL / NET: 100 mL      T(C): 36.9 (21 @ 12:41), Max: 36.9 (21 @ 12:41)  HR: 84 (21 @ 12:41) (84 - 89)  BP: 116/81 (21 @ 12:41) (114/79 - 118/76)  RR: 18 (21 @ 12:41) (17 - 18)  SpO2: 99% (21 @ 12:41) (97% - 99%)      Physical Exam:  General: well developed well nourished, in no acute distress  Neck: supple, trachea midline  Lungs: clear, no wheeze/rhonchi  Cardiovascular: regular rate and rhythm, S1 S2  Abdomen: soft, nontender,  bowel sounds normal  Neurological: alert and oriented x3  Skin: no rash  Extremities: right hand erythema swelling much improved.   Right 2nd digit mild swelling better range of motion      LABS:                          12.2   7.53  )-----------( 228      ( 19 May 2021 08:19 )             36.1           142  |  110<H>  |  7   ----------------------------<  84  3.8   |  23  |  0.63    Ca    8.2<L>      19 May 2021 08:19    TPro  7.0  /  Alb  3.2<L>  /  TBili  0.5  /  DBili  x   /  AST  8<L>  /  ALT  9<L>  /  AlkPhos  44        PT/INR - ( 18 May 2021 22:06 )   PT: 11.4 sec;   INR: 0.97 ratio         PTT - ( 18 May 2021 22:06 )  PTT:25.8 sec    MICROBIOLOGY:    Culture - Blood (collected 19 May 2021 00:54)  Source: .Blood Blood-Peripheral  Preliminary Report (20 May 2021 01:25):    No growth to date.    Culture - Blood (collected 19 May 2021 00:54)  Source: .Blood Blood-Peripheral  Preliminary Report (20 May 2021 01:25):    No growth to date.    RADIOLOGY & ADDITIONAL STUDIES:    EXAM:  XR HAND MIN 3 VIEWS RT                            PROCEDURE DATE:  2021          INTERPRETATION:  Right hand. Patient has an infected cat bite. 3 views.    There is slight carpal degeneration.    No bone destruction or fracture. No radiopaque foreign body or gas in soft tissues evident.    IMPRESSION: No acute finding.    Assessment :   49YO F PMH mitral valve prolapse admitted with right hand and 2nd digit cellulitis possible tenosynovitis sec cat bite.   Pt unclear of cat's vaccination status  Received tetanus vaccine  High risk for OM   Improved    Plan :   On Unasyn  MIDLINE  Pt to go home on Rocephin 2grams IV daily x 4 weeks due to high risk of OM  Trend temps and cbc      Continue with present regiment.  Appropriate use of antibiotics and adverse effects reviewed.      I have discussed the above plan of care with patient in detail. She expressed understanding of the  treatment plan . Risks, benefits and alternatives discussed in detail. I have asked if she has any questions or concerns and appropriately addressed them to the best of my ability .    > 35 minutes were spent in direct patient care reviewing notes, medications ,labs data/ imaging , discussion with multidisciplinary team.    Thank you for allowing me to participate in care of your patient .    Samson Gee MD  Infectious Disease  [phone]      FER GUZMAN is a 50yFemale , patient examined and chart reviewed.    INTERVAL HPI/ OVERNIGHT EVENTS:   Afebrile.  No events.    PAST MEDICAL & SURGICAL HISTORY:  Mitral valve prolapse  S/P  section        For details regarding the patient's social history, family history, and other miscellaneous elements, please refer the initial infectious diseases consultation and/or the admitting history and physical examination for this admission.    ROS:  CONSTITUTIONAL:  Negative fever or chills  EYES:  Negative  blurry vision or double vision  CARDIOVASCULAR:  Negative for chest pain or palpitations  RESPIRATORY:  Negative for cough, wheezing, or SOB   GASTROINTESTINAL:  Negative for nausea, vomiting, diarrhea, constipation, or abdominal pain  GENITOURINARY:  Negative frequency, urgency or dysuria  NEUROLOGIC:  No headache, confusion, dizziness, lightheadedness  All other systems were reviewed and are negative       Current inpatient medications :    ANTIBIOTICS/RELEVANT:  ampicillin/sulbactam  IVPB 3 Gram(s) IV Intermittent every 6 hours  saccharomyces boulardii 250 milliGRAM(s) Oral two times a day  Tri-legest fe 1 Tablet(s) 1 Tablet(s) Oral at bedtime      acetaminophen   Tablet .. 650 milliGRAM(s) Oral every 4 hours PRN  enoxaparin Injectable 40 milliGRAM(s) SubCutaneous daily  ibuprofen  Tablet. 600 milliGRAM(s) Oral every 6 hours PRN  ketorolac   Injectable 15 milliGRAM(s) IV Push every 6 hours PRN      Objective:     @ 07:  -   @ 07:00  --------------------------------------------------------  IN: 200 mL / OUT: 0 mL / NET: 200 mL     @ 07:01  -   @ 16:11  --------------------------------------------------------  IN: 100 mL / OUT: 0 mL / NET: 100 mL      T(C): 36.9 (21 @ 12:41), Max: 36.9 (21 @ 12:41)  HR: 84 (21 @ 12:41) (84 - 89)  BP: 116/81 (21 @ 12:41) (114/79 - 118/76)  RR: 18 (21 @ 12:41) (17 - 18)  SpO2: 99% (21 @ 12:41) (97% - 99%)      Physical Exam:  General: well developed well nourished, in no acute distress  Neck: supple, trachea midline  Lungs: clear, no wheeze/rhonchi  Cardiovascular: regular rate and rhythm, S1 S2  Abdomen: soft, nontender,  bowel sounds normal  Neurological: alert and oriented x3  Skin: no rash  Extremities: right hand erythema swelling much improved.   Right 2nd digit mild swelling better range of motion      LABS:                          12.2   7.53  )-----------( 228      ( 19 May 2021 08:19 )             36.1           142  |  110<H>  |  7   ----------------------------<  84  3.8   |  23  |  0.63    Ca    8.2<L>      19 May 2021 08:19    TPro  7.0  /  Alb  3.2<L>  /  TBili  0.5  /  DBili  x   /  AST  8<L>  /  ALT  9<L>  /  AlkPhos  44        PT/INR - ( 18 May 2021 22:06 )   PT: 11.4 sec;   INR: 0.97 ratio         PTT - ( 18 May 2021 22:06 )  PTT:25.8 sec    MICROBIOLOGY:    Culture - Blood (collected 19 May 2021 00:54)  Source: .Blood Blood-Peripheral  Preliminary Report (20 May 2021 01:25):    No growth to date.    Culture - Blood (collected 19 May 2021 00:54)  Source: .Blood Blood-Peripheral  Preliminary Report (20 May 2021 01:25):    No growth to date.    RADIOLOGY & ADDITIONAL STUDIES:    EXAM:  XR HAND MIN 3 VIEWS RT                            PROCEDURE DATE:  2021          INTERPRETATION:  Right hand. Patient has an infected cat bite. 3 views.    There is slight carpal degeneration.    No bone destruction or fracture. No radiopaque foreign body or gas in soft tissues evident.    IMPRESSION: No acute finding.    Assessment :   49YO F PMH mitral valve prolapse admitted with right hand and 2nd digit cellulitis possible tenosynovitis sec cat bite.   Pt unclear of cat's vaccination status  Received tetanus vaccine  High risk for OM   Improved    Plan :   On Unasyn  MIDLINE  Pt to go home on Rocephin 2grams IV daily x 4 weeks till 21 due to high risk of OM  Trend temps and cbc      Continue with present regiment.  Appropriate use of antibiotics and adverse effects reviewed.      I have discussed the above plan of care with patient in detail. She expressed understanding of the  treatment plan . Risks, benefits and alternatives discussed in detail. I have asked if she has any questions or concerns and appropriately addressed them to the best of my ability .    > 35 minutes were spent in direct patient care reviewing notes, medications ,labs data/ imaging , discussion with multidisciplinary team.    Thank you for allowing me to participate in care of your patient .    Samson Gee MD  Infectious Disease  [phone]

## 2021-05-20 NOTE — PROGRESS NOTE ADULT - SUBJECTIVE AND OBJECTIVE BOX
[facility] Cardiology    CHIEF COMPLAINT: Patient is a 50y old  Female who presents with a chief complaint of cellulitis (19 May 2021 15:37)      Follow Up: [ ] Chest Pain      [ ] Dyspnea     [ ] Palpitations    [ ] Atrial Fibrillation     [ ] Ventricular Dysrhythmia    [ ] Abnormal EKG                      [ ] Abnormal Cardiac Enzymes     [ ] Valvular Disease    HPI:  49 yo f PMH mitral valve prolapse presents s/p cat bite. 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 Per patient the area "turned blue" soon after. Patient went to Urgent Care where she received a tetanus vaccine and was prescribed Augmentin 875mg BID. Patient took her evening and morning doses. On day of admission patient noted increased swelling and progression of erythema to the dorsal hand and distal dorsal forearm on the radial aspect. Notes pain 7/10 at rest, 9/10 w/ motion. She is able to flex and extend her index finger, flexion limited by pain.     In the ED CBC, CMP, coags, hcg, lactate, COVID PCR were performed. Labs grossly wnl. Pt was given 1L NS. Unasyn 3g x 1. X ray of hand was performed.   EKG NSR, Qtc 450 (19 May 2021 01:34)    PAST MEDICAL & SURGICAL HISTORY:  Mitral valve prolapse    S/P  section      MEDICATIONS  (STANDING):  ampicillin/sulbactam  IVPB 3 Gram(s) IV Intermittent every 6 hours  enoxaparin Injectable 40 milliGRAM(s) SubCutaneous daily  saccharomyces boulardii 250 milliGRAM(s) Oral two times a day  Tri-legest fe 1 Tablet(s) 1 Tablet(s) Oral at bedtime    MEDICATIONS  (PRN):  acetaminophen   Tablet .. 650 milliGRAM(s) Oral every 4 hours PRN Mild Pain (1 - 3)  ibuprofen  Tablet. 600 milliGRAM(s) Oral every 6 hours PRN Moderate Pain (4 - 6)  ketorolac   Injectable 15 milliGRAM(s) IV Push every 6 hours PRN Severe Pain (7 - 10)    Allergies    No Known Allergies    Intolerances        REVIEW OF SYSTEMS:    CONSTITUTIONAL: No weakness, fevers or chills.   EYES/ENT: No visual changes;    NECK: No pain or stiffness  RESPIRATORY: No cough, wheezing, No shortness of breath  CARDIOVASCULAR: No chest pain or palpitations  GASTROINTESTINAL: No abdominal pain, or hematochezia.  GENITOURINARY: No dysuria orhematuria  NEUROLOGICAL: No numbness or weakness  SKIN: No itching, burning, rashes  All other review of systems is negative unless indicated above    Vital Signs Last 24 Hrs  T(C): 36.8 (20 May 2021 05:47), Max: 36.8 (20 May 2021 05:47)  T(F): 98.2 (20 May 2021 05:47), Max: 98.2 (20 May 2021 05:47)  HR: 84 (20 May 2021 05:47) (84 - 90)  BP: 114/79 (20 May 2021 05:47) (114/79 - 120/79)  BP(mean): --  RR: 17 (20 May 2021 05:47) (17 - 17)  SpO2: 98% (20 May 2021 05:47) (97% - 99%)  I&O's Summary    19 May 2021 07:01  -  20 May 2021 07:00  --------------------------------------------------------  IN: 200 mL / OUT: 0 mL / NET: 200 mL    20 May 2021 07:01  -  20 May 2021 12:17  --------------------------------------------------------  IN: 100 mL / OUT: 0 mL / NET: 100 mL        PHYSICAL EXAM:  Constitutional: NAD  Neurological: Alert, no focal deficits  HEENT: no JVD, EOMI  Cardiovascular: Regular, S1 and S2, no murmur  Pulmonary: breath sounds bilaterally  Gastrointestinal: Bowel Sounds present, soft, nontender  EXT:  no peripheral edema  Skin: No rashes.  Psych:  Mood calm  LABS: All Labs Reviewed:                          12.   753  )-----------( 228      ( 19 May 2021 08:19 )             36.1     05-19    142  |  110<H>  |  7   ----------------------------<  84  3.8   |  23  |  0.63    Ca    8.2<L>      19 May 2021 08:19    TPro  7.0  /  Alb  3.2<L>  /  TBili  0.5  /  DBili  x   /  AST  8<L>  /  ALT  9<L>  /  AlkPhos  44  05-    PT/INR - ( 18 May 2021 22:06 )   PT: 11.4 sec;   INR: 0.97 ratio         PTT - ( 18 May 2021 22:06 )  PTT:25.8 sec    12 Lead ECG:   Ventricular Rate 92 BPM  Atrial Rate 92 BPM  P-R Interval 132 ms  QRS Duration 74 ms  Q-T Interval 364 ms  QTC Calculation(Bazett) 450 ms  P Axis 14 degrees  R Axis 65 degrees  T Axis 37 degrees    Diagnosis Line Normal sinus rhythm  Normal ECG  No previous ECGs available  Confirmed by EWELINA WYLIE ([id number]) on 2021 5:20:18 PM (21 @ 21:35)    · Assessment	  49 yo f no significant pmh presents s/p cat bite. Admit for cellulitis     - Cellulits  Abx tx per primary team.    - MVP  Pt with h/o MVP last echo about 2 years ago with cardiologist at [facility].  Denies any cardiac sx with no chest discomfort, palpitation or shortness of breath.  Pt reports being very active with no sympt.  Exam is unremarkable with no click or murmur appreciated.   No cardiac issue of concern at this time.    Will follow prn

## 2021-05-20 NOTE — PROGRESS NOTE ADULT - SUBJECTIVE AND OBJECTIVE BOX
Patient is a 50y old  Female who presents with a chief complaint of cellulitis (20 May 2021 16:11)    Date of servie : 05-20-21 @ 20:41  INTERVAL HPI/OVERNIGHT EVENTS:  T(C): 36.9 (05-20-21 @ 12:41), Max: 36.9 (05-20-21 @ 12:41)  HR: 84 (05-20-21 @ 12:41) (84 - 89)  BP: 116/81 (05-20-21 @ 12:41) (114/79 - 118/76)  RR: 18 (05-20-21 @ 12:41) (17 - 18)  SpO2: 99% (05-20-21 @ 12:41) (97% - 99%)  Wt(kg): --  I&O's Summary    19 May 2021 07:01  -  20 May 2021 07:00  --------------------------------------------------------  IN: 200 mL / OUT: 0 mL / NET: 200 mL    20 May 2021 07:01  -  20 May 2021 20:41  --------------------------------------------------------  IN: 150 mL / OUT: 0 mL / NET: 150 mL        LABS:                        12.2   7.53  )-----------( 228      ( 19 May 2021 08:19 )             36.1     05-19    142  |  110<H>  |  7   ----------------------------<  84  3.8   |  23  |  0.63    Ca    8.2<L>      19 May 2021 08:19    TPro  7.0  /  Alb  3.2<L>  /  TBili  0.5  /  DBili  x   /  AST  8<L>  /  ALT  9<L>  /  AlkPhos  44  05-19    PT/INR - ( 18 May 2021 22:06 )   PT: 11.4 sec;   INR: 0.97 ratio         PTT - ( 18 May 2021 22:06 )  PTT:25.8 sec    CAPILLARY BLOOD GLUCOSE                MEDICATIONS  (STANDING):  enoxaparin Injectable 40 milliGRAM(s) SubCutaneous daily  saccharomyces boulardii 250 milliGRAM(s) Oral two times a day  Tri-legest fe 1 Tablet(s) 1 Tablet(s) Oral at bedtime    MEDICATIONS  (PRN):  acetaminophen   Tablet .. 650 milliGRAM(s) Oral every 4 hours PRN Mild Pain (1 - 3)  ibuprofen  Tablet. 600 milliGRAM(s) Oral every 6 hours PRN Moderate Pain (4 - 6)  ketorolac   Injectable 15 milliGRAM(s) IV Push every 6 hours PRN Severe Pain (7 - 10)          PHYSICAL EXAM:  GENERAL: NAD, well-groomed, well-developed  HEAD:  Atraumatic, Normocephalic  CHEST/LUNG: Clear to percussion bilaterally; No rales, rhonchi, wheezing, or rubs  HEART: Regular rate and rhythm; No murmurs, rubs, or gallops  ABDOMEN: Soft, Nontender, Nondistended; Bowel sounds present  EXTREMITIES:  2+ Peripheral Pulses, No clubbing, cyanosis, or edema  LYMPH: No lymphadenopathy noted  SKIN: No rashes or lesions    Care Discussed with Consultants/Other Providers [ ] YES  [ ] NO

## 2021-05-21 VITALS
RESPIRATION RATE: 18 BRPM | OXYGEN SATURATION: 98 % | SYSTOLIC BLOOD PRESSURE: 117 MMHG | DIASTOLIC BLOOD PRESSURE: 75 MMHG | TEMPERATURE: 99 F | HEART RATE: 88 BPM

## 2021-05-21 PROCEDURE — 85610 PROTHROMBIN TIME: CPT

## 2021-05-21 PROCEDURE — 84702 CHORIONIC GONADOTROPIN TEST: CPT

## 2021-05-21 PROCEDURE — 36410 VNPNXR 3YR/> PHY/QHP DX/THER: CPT

## 2021-05-21 PROCEDURE — 93005 ELECTROCARDIOGRAM TRACING: CPT

## 2021-05-21 PROCEDURE — U0003: CPT

## 2021-05-21 PROCEDURE — 96365 THER/PROPH/DIAG IV INF INIT: CPT

## 2021-05-21 PROCEDURE — 85730 THROMBOPLASTIN TIME PARTIAL: CPT

## 2021-05-21 PROCEDURE — 80053 COMPREHEN METABOLIC PANEL: CPT

## 2021-05-21 PROCEDURE — 86769 SARS-COV-2 COVID-19 ANTIBODY: CPT

## 2021-05-21 PROCEDURE — C1751: CPT

## 2021-05-21 PROCEDURE — U0005: CPT

## 2021-05-21 PROCEDURE — 73130 X-RAY EXAM OF HAND: CPT

## 2021-05-21 PROCEDURE — 85025 COMPLETE CBC W/AUTO DIFF WBC: CPT

## 2021-05-21 PROCEDURE — 76937 US GUIDE VASCULAR ACCESS: CPT

## 2021-05-21 PROCEDURE — 36415 COLL VENOUS BLD VENIPUNCTURE: CPT

## 2021-05-21 PROCEDURE — 76937 US GUIDE VASCULAR ACCESS: CPT | Mod: 26

## 2021-05-21 PROCEDURE — 83605 ASSAY OF LACTIC ACID: CPT

## 2021-05-21 PROCEDURE — 99285 EMERGENCY DEPT VISIT HI MDM: CPT

## 2021-05-21 PROCEDURE — 87040 BLOOD CULTURE FOR BACTERIA: CPT

## 2021-05-21 RX ORDER — CEFTRIAXONE 500 MG/1
2 INJECTION, POWDER, FOR SOLUTION INTRAMUSCULAR; INTRAVENOUS
Qty: 0 | Refills: 0 | DISCHARGE
Start: 2021-05-21 | End: 2021-06-16

## 2021-05-21 RX ADMIN — Medication 250 MILLIGRAM(S): at 05:39

## 2021-05-21 RX ADMIN — CEFTRIAXONE 100 MILLIGRAM(S): 500 INJECTION, POWDER, FOR SOLUTION INTRAMUSCULAR; INTRAVENOUS at 05:39

## 2021-05-21 NOTE — PROGRESS NOTE ADULT - SUBJECTIVE AND OBJECTIVE BOX
FER GUZMAN is a 50yFemale , patient examined and chart reviewed.    INTERVAL HPI/ OVERNIGHT EVENTS:   Afebrile. Midline placed.  No events.    PAST MEDICAL & SURGICAL HISTORY:  Mitral valve prolapse  S/P  section        For details regarding the patient's social history, family history, and other miscellaneous elements, please refer the initial infectious diseases consultation and/or the admitting history and physical examination for this admission.    ROS:  CONSTITUTIONAL:  Negative fever or chills  EYES:  Negative  blurry vision or double vision  CARDIOVASCULAR:  Negative for chest pain or palpitations  RESPIRATORY:  Negative for cough, wheezing, or SOB   GASTROINTESTINAL:  Negative for nausea, vomiting, diarrhea, constipation, or abdominal pain  GENITOURINARY:  Negative frequency, urgency or dysuria  NEUROLOGIC:  No headache, confusion, dizziness, lightheadedness  All other systems were reviewed and are negative       Current inpatient medications :    ANTIBIOTICS/RELEVANT:  cefTRIAXone   IVPB 2000 milliGRAM(s) IV Intermittent every 24 hours    MEDICATIONS  (STANDING):  enoxaparin Injectable 40 milliGRAM(s) SubCutaneous daily  saccharomyces boulardii 250 milliGRAM(s) Oral two times a day  Tri-legest fe 1 Tablet(s) 1 Tablet(s) Oral at bedtime    MEDICATIONS  (PRN):  acetaminophen   Tablet .. 650 milliGRAM(s) Oral every 4 hours PRN Mild Pain (1 - 3)  ibuprofen  Tablet. 600 milliGRAM(s) Oral every 6 hours PRN Moderate Pain (4 - 6)  ketorolac   Injectable 15 milliGRAM(s) IV Push every 6 hours PRN Severe Pain (7 - 10)      Objective:  Vital Signs Last 24 Hrs  T(C): 37 (21 May 2021 13:49), Max: 37 (20 May 2021 21:08)  T(F): 98.6 (21 May 2021 13:49), Max: 98.6 (20 May 2021 21:08)  HR: 88 (21 May 2021 13:49) (77 - 88)  BP: 117/75 (21 May 2021 13:49) (106/70 - 127/78)  RR: 18 (21 May 2021 13:49) (18 - 18)  SpO2: 98% (21 May 2021 13:49) (96% - 98%)      Physical Exam:  General: no acute distress  Neck: supple, trachea midline  Lungs: clear, no wheeze/rhonchi  Cardiovascular: regular rate and rhythm, S1 S2  Abdomen: soft, nontender,  bowel sounds normal  Neurological: alert and oriented x3  Skin: no rash  Extremities: right hand erythema swelling much improved.   Right 2nd digit mild swelling better range of motion      LABS:      MICROBIOLOGY:    Culture - Blood (collected 19 May 2021 00:54)  Source: .Blood Blood-Peripheral  Preliminary Report (20 May 2021 01:25):    No growth to date.    Culture - Blood (collected 19 May 2021 00:54)  Source: .Blood Blood-Peripheral  Preliminary Report (20 May 2021 01:25):    No growth to date.    RADIOLOGY & ADDITIONAL STUDIES:    EXAM:  XR HAND MIN 3 VIEWS RT                            PROCEDURE DATE:  2021          INTERPRETATION:  Right hand. Patient has an infected cat bite. 3 views.    There is slight carpal degeneration.    No bone destruction or fracture. No radiopaque foreign body or gas in soft tissues evident.    IMPRESSION: No acute finding.    Assessment :   51YO F PMH mitral valve prolapse admitted with right hand and 2nd digit cellulitis possible tenosynovitis sec cat bite.   Pt unclear of cat's vaccination status  Received tetanus vaccine  High risk for OM   Improved    Plan :   On Rocephin 2gram IV daily Off Unasyn  Sp MIDLINE  Pt to go home on Rocephin 2grams IV daily x 4 weeks till 21 due to high risk of OM  Trend temps and cbc    D/w Dr ROBERT Lester    Continue with present regiment.  Appropriate use of antibiotics and adverse effects reviewed.      I have discussed the above plan of care with patient in detail. She expressed understanding of the  treatment plan . Risks, benefits and alternatives discussed in detail. I have asked if she has any questions or concerns and appropriately addressed them to the best of my ability .    > 35 minutes were spent in direct patient care reviewing notes, medications ,labs data/ imaging , discussion with multidisciplinary team.    Thank you for allowing me to participate in care of your patient .    Samson Gee MD  Infectious Disease  [phone]

## 2021-05-21 NOTE — DISCHARGE NOTE PROVIDER - HOSPITAL COURSE
51 yo f PMH mitral valve prolapse presents s/p cat bite. 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 Per patient the area "turned blue" soon after. Patient went to Urgent Care where she received a tetanus vaccine and was prescribed Augmentin 875mg BID. Patient took her evening and morning doses. On day of admission patient noted increased swelling and progression of erythema to the dorsal hand and distal dorsal forearm on the radial aspect. Notes pain 7/10 at rest, 9/10 w/ motion. She is able to flex and extend her index finger, flexion limited by pain. sp PICC , IV antibiotics as per ID

## 2021-05-21 NOTE — DISCHARGE NOTE PROVIDER - NSDCMRMEDTOKEN_GEN_ALL_CORE_FT
cefTRIAXone: 2 gram(s) intravenous once a day  Tri-Legest Fe oral tablet: 1 tab(s) orally once a day

## 2021-05-21 NOTE — DISCHARGE NOTE NURSING/CASE MANAGEMENT/SOCIAL WORK - PATIENT PORTAL LINK FT
You can access the FollowMyHealth Patient Portal offered by [facility] by registering at the following website: [email or web address]. By joining [employer]’s FollowMyHealth portal, you will also be able to view your health information using other applications (apps) compatible with our system.

## 2021-05-21 NOTE — DISCHARGE NOTE PROVIDER - CARE PROVIDER_API CALL
Obie Gee)  Infectious Disease; Internal Medicine  [address] [facility], [facility] [address]  [place] [address]  Phone: [phone]  Fax: [phone]  Follow Up Time:

## 2021-05-21 NOTE — DISCHARGE NOTE PROVIDER - NSDCCPCAREPLAN_GEN_ALL_CORE_FT
PRINCIPAL DISCHARGE DIAGNOSIS  Diagnosis: Cat bite, initial encounter  Assessment and Plan of Treatment:       SECONDARY DISCHARGE DIAGNOSES  Diagnosis: Cellulitis  Assessment and Plan of Treatment:

## 2021-05-21 NOTE — PROCEDURE NOTE - PROCEDURE FINDINGS AND DETAILS
inserted 11cm bard power midline into right patent basilic vein under sterile conditions and ultrasound guidance.

## 2021-05-21 NOTE — PROGRESS NOTE ADULT - SUBJECTIVE AND OBJECTIVE BOX
· Assessment	  49 yo f no significant pmh presents s/p cat bite. Admit for cellulitis     - Cellulits  Abx tx per primary team.    - MVP  Pt with h/o MVP last echo about 2 years ago with cardiologist at [facility].  Denied any cardiac sx with no chest discomfort, palpitation or shortness of breath. Pt reports being very active with no sympt.  Exam is unremarkable with no click or murmur appreciated.  No cardiac issue of concern at this time.    pt off floor this am for midline catheter insertion    Will follow prn, call if needed

## 2021-05-24 LAB
CULTURE RESULTS: SIGNIFICANT CHANGE UP
CULTURE RESULTS: SIGNIFICANT CHANGE UP
SPECIMEN SOURCE: SIGNIFICANT CHANGE UP
SPECIMEN SOURCE: SIGNIFICANT CHANGE UP
